# Patient Record
Sex: FEMALE | Race: BLACK OR AFRICAN AMERICAN | NOT HISPANIC OR LATINO | Employment: PART TIME | URBAN - METROPOLITAN AREA
[De-identification: names, ages, dates, MRNs, and addresses within clinical notes are randomized per-mention and may not be internally consistent; named-entity substitution may affect disease eponyms.]

---

## 2018-10-17 ENCOUNTER — HISTORICAL (OUTPATIENT)
Dept: ADMINISTRATIVE | Facility: HOSPITAL | Age: 43
End: 2018-10-17

## 2018-11-07 ENCOUNTER — HISTORICAL (OUTPATIENT)
Dept: ADMINISTRATIVE | Facility: HOSPITAL | Age: 43
End: 2018-11-07

## 2022-04-11 ENCOUNTER — HISTORICAL (OUTPATIENT)
Dept: ADMINISTRATIVE | Facility: HOSPITAL | Age: 47
End: 2022-04-11

## 2022-04-27 VITALS — WEIGHT: 183.44 LBS | HEIGHT: 66 IN | BODY MASS INDEX: 29.48 KG/M2

## 2022-05-04 NOTE — HISTORICAL [facility]
This is a historical note converted from Tre. Formatting and pictures may have been removed.  Please reference Tre for original formatting and attached multimedia. Ortho Clinic Note  ?   CC:?Right ankle fracture  ?   HPI:  42-year-old female with a right lateral malleolus fracture sustained?about 3 weeks ago.? She did it with an inversion type injury?and has been in the splint since. ?She is putting some weight on the foot?and is not having any pain.? She is eager to return to work even.? She does not smoke and has no?pertinent medical comorbidities.  ?   PE:  GEN: Well developed, well nourished  RESP: Equal chest rise bilaterally, no increased WOB  CV: Skin warm, well perfused  NEURO: AOx3, cooperative  ?  Right lower extremity:  Skin intact  No appreciable swelling  No tenderness about the medial malleolus  Minimal tenderness about the lateral malleolus  Sensation intact to light touch throughout the foot  Moves all toes and ankle without pain  DP 1+  ?  Imaging:  Right ankle films demonstrate?minimally displaced?right lateral malleolus fracture with no appreciable widening on gravity stress views  ?  A/P:  42-year-old female with right?minimally displaced lateral malleolus fracture  ?  -I discussed?surgical versus nonsurgical treatment options with her.? I explained to her that?her x-rays were borderline?for meeting?surgical?indications.? I explained the pros and cons of both operative and nonoperative management?including possible arthritis as a possible consequence of nonoperative management.? She understands these?potential concerns. ?All of her questions were answered.? She wishes to proceed with nonoperative management.  -We will place her in a cast for 3 weeks as she is already 3 weeks status post injury  ?  RTC: 3 weeks with x-rays out of cast.  ?  We will provide her with a note today saying she can return to work as long as she remains nonweightbearing to her right lower extremity   Lamarla  Gary?s?medical history, physical exam findings right ankle, diagnosis, and treatment outlined by??Hannah has been reviewed.??Treatment plan is determined to be reasonable and appropriate.?I was present during the evaluation. X-rays right ankle?reviewed.  ?

## 2022-05-04 NOTE — HISTORICAL [facility]
This is a historical note converted from Tre. Formatting and pictures may have been removed.  Please reference Tre for original formatting and attached multimedia. Ortho Clinic Note  ?   CC:?Right ankle fracture  ?   HPI:  43-year-old female with a right lateral malleolus fracture sustained 6 weeks ago.? We elected to treat this nonoperatively and she has been in a cast. ?She is tolerated this very well. ?She has no pain. ?She has done a good job staying off the fracture.  ?   PE:  GEN: Well developed, well nourished  RESP: Equal chest rise bilaterally, no increased WOB  CV: Skin warm, well perfused  NEURO: AOx3, cooperative  ?  Right lower extremity:  No swelling?about the ankle  No tenderness to palpation?about the lateral malleolus  Nearly symmetric?range of motion. ?I think she lacks 5 degrees of dorsiflexion?in both extension of the knee and flexion of the knee compared to the contralateral side  Neurovascularly intact  ?  Imaging:  X-rays today demonstrate no displacement of her lateral malleolus fracture and?there is evidence of interval healing  ?  A/P:  43-year-old?female with right lateral malleolus fracture treated nonoperatively  ?  -She is 6 weeks out and is clinically and radiographically healed.? For this reason we will start to let her weight-bear?as tolerated?on the right foot. ?We will begin in a cam boot with crutches?and wean off the crutches?first?followed by weaning off the cam boot into normal shoes.  -I am really impressed with how well she has done. ?I do not think that she will need much of anything else. ?We will see her back in 6 weeks?to reevaluate.   I was present with the resident during the history and exam.  ???  [ x] I discussed the case with the resident and agree with the findings and plan as documented in the residents note.  [ ] I discussed the case with the resident and agree with the findings and plan as documented in the residents note except:

## 2024-04-30 ENCOUNTER — OFFICE VISIT (OUTPATIENT)
Dept: INTERNAL MEDICINE | Facility: CLINIC | Age: 49
End: 2024-04-30
Payer: COMMERCIAL

## 2024-04-30 VITALS
HEIGHT: 65 IN | SYSTOLIC BLOOD PRESSURE: 126 MMHG | BODY MASS INDEX: 29.2 KG/M2 | DIASTOLIC BLOOD PRESSURE: 80 MMHG | OXYGEN SATURATION: 99 % | WEIGHT: 175.25 LBS | HEART RATE: 53 BPM | TEMPERATURE: 98 F

## 2024-04-30 DIAGNOSIS — Z00.00 ROUTINE GENERAL MEDICAL EXAMINATION AT A HEALTH CARE FACILITY: Primary | ICD-10-CM

## 2024-04-30 DIAGNOSIS — Z12.11 COLON CANCER SCREENING: ICD-10-CM

## 2024-04-30 DIAGNOSIS — I10 HYPERTENSION, UNSPECIFIED TYPE: ICD-10-CM

## 2024-04-30 PROCEDURE — 99999 PR PBB SHADOW E&M-EST. PATIENT-LVL IV: CPT | Mod: PBBFAC,,, | Performed by: INTERNAL MEDICINE

## 2024-04-30 PROCEDURE — 99386 PREV VISIT NEW AGE 40-64: CPT | Mod: S$GLB,,, | Performed by: INTERNAL MEDICINE

## 2024-04-30 RX ORDER — VALSARTAN 80 MG/1
80 TABLET ORAL DAILY
Qty: 90 TABLET | Refills: 0 | Status: SHIPPED | OUTPATIENT
Start: 2024-04-30 | End: 2025-04-30

## 2024-04-30 NOTE — PROGRESS NOTES
"Subjective:      Patient ID: Milagros Vega is a 48 y.o. female.    Chief Complaint: Establish Care    HPI      48 y.o. with  Patient Active Problem List   Diagnosis    Hypertension    Routine general medical examination at a health care facility     Past Medical History:   Diagnosis Date    Hypertension        Here today for annual prev exam.  Compliant with meds without significant side effects.  appetite are good.     Home bp 140s to 160s/ 87 to 96.     She recently resumed benazepril/amlodipine.  She feels the medication is lowering her blood pressure too fast and causing sluggishness.            Past Surgical History:   Procedure Laterality Date    TUBAL LIGATION       Social History     Socioeconomic History    Marital status: Single   Tobacco Use    Smoking status: Never    Smokeless tobacco: Never   Substance and Sexual Activity    Drug use: Never    Sexual activity: Yes     family history includes Hypertension in her mother.  Review of Systems   Constitutional:  Negative for chills, diaphoresis and fever.   HENT:  Negative for ear pain and sore throat.    Respiratory:  Negative for cough and shortness of breath.    Cardiovascular:  Negative for chest pain, palpitations and leg swelling.   Gastrointestinal:  Negative for abdominal pain and blood in stool.   Genitourinary:  Negative for dysuria and hematuria.   Skin:  Negative for rash.   Neurological:  Negative for seizures, syncope, weakness and headaches.     Objective:   /80 (BP Location: Left arm, Patient Position: Sitting, BP Method: Large (Manual))   Pulse (!) 53   Temp 97.7 °F (36.5 °C) (Tympanic)   Ht 5' 5" (1.651 m)   Wt 79.5 kg (175 lb 4.3 oz)   LMP 04/13/2024   SpO2 99%   BMI 29.17 kg/m²     Physical Exam  Constitutional:       General: She is not in acute distress.     Appearance: She is well-developed.   HENT:      Head: Normocephalic and atraumatic.   Eyes:      Extraocular Movements: Extraocular movements intact.   Neck:      " Thyroid: No thyromegaly.   Cardiovascular:      Rate and Rhythm: Normal rate and regular rhythm.   Pulmonary:      Breath sounds: Normal breath sounds. No wheezing or rales.   Abdominal:      General: Bowel sounds are normal.      Palpations: Abdomen is soft.      Tenderness: There is no abdominal tenderness.   Musculoskeletal:         General: No swelling.      Cervical back: Neck supple. No rigidity.   Lymphadenopathy:      Cervical: No cervical adenopathy.   Skin:     General: Skin is warm and dry.   Neurological:      Mental Status: She is alert and oriented to person, place, and time.   Psychiatric:         Behavior: Behavior normal.         Lab Results   Component Value Date    WBC 11.6 (H) 03/28/2020    HGB 10.9 (L) 03/28/2020    HCT 34.6 (L) 03/28/2020    MCV 89.6 03/28/2020     03/28/2020    ALT 18 03/28/2020    AST 17 03/28/2020     03/28/2020    K 3.3 (L) 03/28/2020    CALCIUM 8.7 03/28/2020    CO2 24 03/28/2020    BUN 13 03/28/2020    CREATININE 1.00 03/28/2020          The ASCVD Risk score (Temi HSU, et al., 2019) failed to calculate for the following reasons:    Cannot find a previous HDL lab    Cannot find a previous total cholesterol lab     Assessment:     1. Routine general medical examination at a health care facility    2. Hypertension, unspecified type    3. Colon cancer screening      Plan:   1. Routine general medical examination at a health care facility  Overview:  Heart healthy diet, regular exercise, and regular use of sunscreen.    reviewed.     Orders:  -     Comprehensive Metabolic Panel; Future; Expected date: 04/30/2024  -     CBC Auto Differential; Future; Expected date: 04/30/2024  -     TSH; Future; Expected date: 04/30/2024  -     Lipid Panel; Future; Expected date: 04/30/2024  -     Hepatitis C Antibody; Future; Expected date: 04/30/2024  -     HIV 1/2 Ag/Ab (4th Gen); Future; Expected date: 04/30/2024  -     Hemoglobin A1C; Future; Expected date: 04/30/2024  -      PSA, Screening; Future; Expected date: 04/30/2024    2. Hypertension, unspecified type  -     Ambulatory referral/consult to Internal Medicine    3. Colon cancer screening  -     Ambulatory referral/consult to Endo Procedure ; Future; Expected date: 05/01/2024    Other orders  -     valsartan (DIOVAN) 80 MG tablet; Take 1 tablet (80 mg total) by mouth once daily.  Dispense: 90 tablet; Refill: 0    Reports side effects with amlodipine/benazepril.  Will try valsartan.    There are no Patient Instructions on file for this visit.    Future Appointments   Date Time Provider Department Center   5/7/2024  1:00 PM MAIN ULTRASOUND, [facility] US [place] [facility]   5/7/2024  2:10 PM Estelle Holbrook MD [facility] OBG[facility]GAN [facility]   5/14/2024 11:20 AM Stephon Camara MD [facility] IM [place]   4/23/2025  1:10 PM [facility]  MAMMO1 SCREEN [facility] MAMMO [facility]   4/23/2025  2:00 PM [facility]       Lab Frequency Next Occurrence    OB/GYN Procedure ([facility]) - Extended List Once 04/29/2024       Follow up in about 2 weeks (around 5/14/2024), or if symptoms worsen or fail to improve, for fasting labs thursday.

## 2024-05-01 ENCOUNTER — HOSPITAL ENCOUNTER (OUTPATIENT)
Dept: PREADMISSION TESTING | Facility: HOSPITAL | Age: 49
Discharge: HOME | End: 2024-05-01
Attending: INTERNAL MEDICINE
Payer: COMMERCIAL

## 2024-05-01 DIAGNOSIS — Z12.11 COLON CANCER SCREENING: ICD-10-CM

## 2024-05-02 ENCOUNTER — LAB VISIT (OUTPATIENT)
Dept: LAB | Facility: HOSPITAL | Age: 49
End: 2024-05-02
Attending: INTERNAL MEDICINE
Payer: COMMERCIAL

## 2024-05-02 DIAGNOSIS — Z00.00 ROUTINE GENERAL MEDICAL EXAMINATION AT A HEALTH CARE FACILITY: ICD-10-CM

## 2024-05-02 LAB
ALBUMIN SERPL BCP-MCNC: 3.9 G/DL (ref 3.5–5.2)
ALP SERPL-CCNC: 68 U/L (ref 55–135)
ALT SERPL W/O P-5'-P-CCNC: 15 U/L (ref 10–44)
ANION GAP SERPL CALC-SCNC: 9 MMOL/L (ref 8–16)
AST SERPL-CCNC: 20 U/L (ref 10–40)
BASOPHILS # BLD AUTO: 0.03 K/UL (ref 0–0.2)
BASOPHILS NFR BLD: 0.5 % (ref 0–1.9)
BILIRUB SERPL-MCNC: 0.8 MG/DL (ref 0.1–1)
BUN SERPL-MCNC: 15 MG/DL (ref 6–20)
CALCIUM SERPL-MCNC: 9.6 MG/DL (ref 8.7–10.5)
CHLORIDE SERPL-SCNC: 108 MMOL/L (ref 95–110)
CHOLEST SERPL-MCNC: 141 MG/DL (ref 120–199)
CHOLEST/HDLC SERPL: 2.8 {RATIO} (ref 2–5)
CO2 SERPL-SCNC: 24 MMOL/L (ref 23–29)
CREAT SERPL-MCNC: 1.1 MG/DL (ref 0.5–1.4)
DIFFERENTIAL METHOD BLD: ABNORMAL
EOSINOPHIL # BLD AUTO: 0.1 K/UL (ref 0–0.5)
EOSINOPHIL NFR BLD: 1.8 % (ref 0–8)
ERYTHROCYTE [DISTWIDTH] IN BLOOD BY AUTOMATED COUNT: 13.8 % (ref 11.5–14.5)
EST. GFR  (NO RACE VARIABLE): >60 ML/MIN/1.73 M^2
ESTIMATED AVG GLUCOSE: 120 MG/DL (ref 68–131)
GLUCOSE SERPL-MCNC: 82 MG/DL (ref 70–110)
HBA1C MFR BLD: 5.8 % (ref 4–5.6)
HCT VFR BLD AUTO: 37.7 % (ref 37–48.5)
HCV AB SERPL QL IA: NORMAL
HDLC SERPL-MCNC: 51 MG/DL (ref 40–75)
HDLC SERPL: 36.2 % (ref 20–50)
HGB BLD-MCNC: 11.9 G/DL (ref 12–16)
HIV 1+2 AB+HIV1 P24 AG SERPL QL IA: NORMAL
IMM GRANULOCYTES # BLD AUTO: 0.01 K/UL (ref 0–0.04)
IMM GRANULOCYTES NFR BLD AUTO: 0.2 % (ref 0–0.5)
LDLC SERPL CALC-MCNC: 83.2 MG/DL (ref 63–159)
LYMPHOCYTES # BLD AUTO: 3.1 K/UL (ref 1–4.8)
LYMPHOCYTES NFR BLD: 54.3 % (ref 18–48)
MCH RBC QN AUTO: 29.2 PG (ref 27–31)
MCHC RBC AUTO-ENTMCNC: 31.6 G/DL (ref 32–36)
MCV RBC AUTO: 93 FL (ref 82–98)
MONOCYTES # BLD AUTO: 0.4 K/UL (ref 0.3–1)
MONOCYTES NFR BLD: 6.4 % (ref 4–15)
NEUTROPHILS # BLD AUTO: 2.1 K/UL (ref 1.8–7.7)
NEUTROPHILS NFR BLD: 36.8 % (ref 38–73)
NONHDLC SERPL-MCNC: 90 MG/DL
NRBC BLD-RTO: 0 /100 WBC
PLATELET # BLD AUTO: 241 K/UL (ref 150–450)
PMV BLD AUTO: 10.9 FL (ref 9.2–12.9)
POTASSIUM SERPL-SCNC: 3.8 MMOL/L (ref 3.5–5.1)
PROT SERPL-MCNC: 7.5 G/DL (ref 6–8.4)
RBC # BLD AUTO: 4.07 M/UL (ref 4–5.4)
SODIUM SERPL-SCNC: 141 MMOL/L (ref 136–145)
T4 FREE SERPL-MCNC: 0.78 NG/DL (ref 0.71–1.51)
TRIGL SERPL-MCNC: 34 MG/DL (ref 30–150)
TSH SERPL DL<=0.005 MIU/L-ACNC: 4.59 UIU/ML (ref 0.4–4)
WBC # BLD AUTO: 5.62 K/UL (ref 3.9–12.7)

## 2024-05-02 PROCEDURE — 85025 COMPLETE CBC W/AUTO DIFF WBC: CPT | Performed by: INTERNAL MEDICINE

## 2024-05-02 PROCEDURE — 83036 HEMOGLOBIN GLYCOSYLATED A1C: CPT | Performed by: INTERNAL MEDICINE

## 2024-05-02 PROCEDURE — 84439 ASSAY OF FREE THYROXINE: CPT | Performed by: INTERNAL MEDICINE

## 2024-05-02 PROCEDURE — 80053 COMPREHEN METABOLIC PANEL: CPT | Performed by: INTERNAL MEDICINE

## 2024-05-02 PROCEDURE — 80061 LIPID PANEL: CPT | Performed by: INTERNAL MEDICINE

## 2024-05-02 PROCEDURE — 86803 HEPATITIS C AB TEST: CPT | Performed by: INTERNAL MEDICINE

## 2024-05-02 PROCEDURE — 87389 HIV-1 AG W/HIV-1&-2 AB AG IA: CPT | Performed by: INTERNAL MEDICINE

## 2024-05-02 PROCEDURE — 84443 ASSAY THYROID STIM HORMONE: CPT | Performed by: INTERNAL MEDICINE

## 2024-05-02 PROCEDURE — 36415 COLL VENOUS BLD VENIPUNCTURE: CPT | Performed by: INTERNAL MEDICINE

## 2024-05-08 ENCOUNTER — PATIENT MESSAGE (OUTPATIENT)
Dept: RESEARCH | Facility: HOSPITAL | Age: 49
End: 2024-05-08
Payer: COMMERCIAL

## 2024-05-14 ENCOUNTER — OFFICE VISIT (OUTPATIENT)
Dept: INTERNAL MEDICINE | Facility: CLINIC | Age: 49
End: 2024-05-14
Payer: COMMERCIAL

## 2024-05-14 VITALS
DIASTOLIC BLOOD PRESSURE: 82 MMHG | BODY MASS INDEX: 29.94 KG/M2 | SYSTOLIC BLOOD PRESSURE: 136 MMHG | WEIGHT: 179.69 LBS | TEMPERATURE: 97 F | HEIGHT: 65 IN | OXYGEN SATURATION: 99 % | HEART RATE: 57 BPM

## 2024-05-14 DIAGNOSIS — I10 HYPERTENSION, UNSPECIFIED TYPE: Primary | ICD-10-CM

## 2024-05-14 DIAGNOSIS — R79.89 ELEVATED TSH: ICD-10-CM

## 2024-05-14 PROCEDURE — 99213 OFFICE O/P EST LOW 20 MIN: CPT | Mod: S$GLB,,, | Performed by: INTERNAL MEDICINE

## 2024-05-14 PROCEDURE — 99999 PR PBB SHADOW E&M-EST. PATIENT-LVL IV: CPT | Mod: PBBFAC,,, | Performed by: INTERNAL MEDICINE

## 2024-05-14 NOTE — PROGRESS NOTES
"Subjective:      Patient ID: Milagros Vega is a 48 y.o. female.    Chief Complaint: Follow-up    HPI    47 yo with   Patient Active Problem List   Diagnosis    Hypertension    Routine general medical examination at a health care facility     Past Medical History:   Diagnosis Date    Hypertension      Here today for f/u on htn.    Home bp 128 to 130/ 84 to 86.       Energy Has improved off of amlodipine.        Review of Systems   Constitutional:  Negative for chills and fever.   Respiratory:  Negative for cough.    Cardiovascular:  Negative for chest pain.   Gastrointestinal:  Negative for abdominal pain.     Objective:   /82 (BP Location: Left arm, Patient Position: Sitting, BP Method: Large (Manual))   Pulse (!) 57   Temp 96.5 °F (35.8 °C) (Tympanic)   Ht 5' 5" (1.651 m)   Wt 81.5 kg (179 lb 10.8 oz)   LMP 04/13/2024   SpO2 99%   BMI 29.90 kg/m²     Physical Exam  Constitutional:       General: She is awake.      Appearance: Normal appearance.   HENT:      Head: Normocephalic and atraumatic.   Eyes:      Conjunctiva/sclera: Conjunctivae normal.   Pulmonary:      Effort: Pulmonary effort is normal.   Musculoskeletal:      Cervical back: Normal range of motion.   Neurological:      Mental Status: She is alert. Mental status is at baseline.   Psychiatric:         Mood and Affect: Mood normal.         Behavior: Behavior normal. Behavior is cooperative.         Thought Content: Thought content normal.         Judgment: Judgment normal.         Lab Results   Component Value Date    WBC 5.62 05/02/2024    HGB 11.9 (L) 05/02/2024    HGB 10.9 (L) 03/28/2020    HCT 37.7 05/02/2024    MCV 93 05/02/2024    MCV 89.6 03/28/2020     05/02/2024    CHOL 141 05/02/2024    TRIG 34 05/02/2024    HDL 51 05/02/2024    LDLCALC 83.2 05/02/2024    ALT 15 05/02/2024    AST 20 05/02/2024     05/02/2024    K 3.8 05/02/2024    CALCIUM 9.6 05/02/2024     05/02/2024    CO2 24 05/02/2024    BUN 15 05/02/2024    " CREATININE 1.1 05/02/2024    CREATININE 1.00 03/28/2020    EGFRNORACEVR >60.0 05/02/2024    TSH 4.594 (H) 05/02/2024    GLU 82 05/02/2024    HGBA1C 5.8 (H) 05/02/2024          The 10-year ASCVD risk score (Temi HSU, et al., 2019) is: 3.1%    Values used to calculate the score:      Age: 48 years      Sex: Female      Is Non- : Yes      Diabetic: No      Tobacco smoker: No      Systolic Blood Pressure: 136 mmHg      Is BP treated: Yes      HDL Cholesterol: 51 mg/dL      Total Cholesterol: 141 mg/dL     Assessment:     1. Hypertension, unspecified type    2. Elevated TSH      Plan:   1. Hypertension, unspecified type  Overview:  Controlled.  Continue current medications.        2. Elevated TSH  -     TSH; Future; Expected date: 11/10/2024  -     T4, FREE; Future; Expected date: 11/14/2024        There are no Patient Instructions on file for this visit.    Future Appointments   Date Time Provider Department Center   5/16/2024  3:30 PM Estelle Holbrook MD [facility]   11/8/2024  9:50 AM LABORATORY, [facility]   11/15/2024  1:40 PM Stephon Camara MD [facility] IM [facility]   4/23/2025  1:10 PM [facility]  MAMMO1 SCREEN [facility] MAMMO [facility]   4/23/2025  2:00 PM Estelle Holbrook MD [facility] O[facility]       Lab Frequency Next Occurrence   PSA, Screening Once 04/30/2024       Follow up in about 6 months (around 11/14/2024), or if symptoms worsen or fail to improve.

## 2024-08-05 PROBLEM — Z00.00 ROUTINE GENERAL MEDICAL EXAMINATION AT A HEALTH CARE FACILITY: Status: RESOLVED | Noted: 2024-04-30 | Resolved: 2024-08-05

## 2024-11-08 ENCOUNTER — LAB VISIT (OUTPATIENT)
Dept: LAB | Facility: HOSPITAL | Age: 49
End: 2024-11-08
Attending: INTERNAL MEDICINE
Payer: COMMERCIAL

## 2024-11-08 DIAGNOSIS — R79.89 ELEVATED TSH: ICD-10-CM

## 2024-11-08 LAB
T4 FREE SERPL-MCNC: 0.76 NG/DL (ref 0.71–1.51)
TSH SERPL DL<=0.005 MIU/L-ACNC: 4.33 UIU/ML (ref 0.4–4)

## 2024-11-08 PROCEDURE — 84443 ASSAY THYROID STIM HORMONE: CPT | Performed by: INTERNAL MEDICINE

## 2024-11-08 PROCEDURE — 36415 COLL VENOUS BLD VENIPUNCTURE: CPT | Performed by: INTERNAL MEDICINE

## 2024-11-08 PROCEDURE — 84439 ASSAY OF FREE THYROXINE: CPT | Performed by: INTERNAL MEDICINE

## 2024-11-15 ENCOUNTER — OFFICE VISIT (OUTPATIENT)
Dept: INTERNAL MEDICINE | Facility: CLINIC | Age: 49
End: 2024-11-15
Payer: COMMERCIAL

## 2024-11-15 VITALS
BODY MASS INDEX: 30.85 KG/M2 | TEMPERATURE: 96 F | WEIGHT: 185.19 LBS | DIASTOLIC BLOOD PRESSURE: 92 MMHG | SYSTOLIC BLOOD PRESSURE: 150 MMHG | HEIGHT: 65 IN | HEART RATE: 56 BPM | OXYGEN SATURATION: 99 %

## 2024-11-15 DIAGNOSIS — Z12.11 COLON CANCER SCREENING: ICD-10-CM

## 2024-11-15 DIAGNOSIS — I10 HYPERTENSION, UNSPECIFIED TYPE: Primary | ICD-10-CM

## 2024-11-15 PROCEDURE — 1159F MED LIST DOCD IN RCRD: CPT | Mod: CPTII,S$GLB,, | Performed by: INTERNAL MEDICINE

## 2024-11-15 PROCEDURE — 99999 PR PBB SHADOW E&M-EST. PATIENT-LVL IV: CPT | Mod: PBBFAC,,, | Performed by: INTERNAL MEDICINE

## 2024-11-15 PROCEDURE — 3080F DIAST BP >= 90 MM HG: CPT | Mod: CPTII,S$GLB,, | Performed by: INTERNAL MEDICINE

## 2024-11-15 PROCEDURE — 3044F HG A1C LEVEL LT 7.0%: CPT | Mod: CPTII,S$GLB,, | Performed by: INTERNAL MEDICINE

## 2024-11-15 PROCEDURE — 99213 OFFICE O/P EST LOW 20 MIN: CPT | Mod: S$GLB,,, | Performed by: INTERNAL MEDICINE

## 2024-11-15 PROCEDURE — 3077F SYST BP >= 140 MM HG: CPT | Mod: CPTII,S$GLB,, | Performed by: INTERNAL MEDICINE

## 2024-11-15 PROCEDURE — 4010F ACE/ARB THERAPY RXD/TAKEN: CPT | Mod: CPTII,S$GLB,, | Performed by: INTERNAL MEDICINE

## 2024-11-15 PROCEDURE — 3008F BODY MASS INDEX DOCD: CPT | Mod: CPTII,S$GLB,, | Performed by: INTERNAL MEDICINE

## 2024-11-15 RX ORDER — VALSARTAN 80 MG/1
80 TABLET ORAL DAILY
Qty: 90 TABLET | Refills: 1 | Status: SHIPPED | OUTPATIENT
Start: 2024-11-15 | End: 2025-11-15

## 2024-11-15 NOTE — PROGRESS NOTES
"Subjective:      Patient ID: Milagros Vega is a 49 y.o. female.    Chief Complaint: Follow-up    HPI    48 yo with   Patient Active Problem List   Diagnosis    Hypertension     Past Medical History:   Diagnosis Date    Hypertension      Here today for f/u on htn.     Home blood pressure 160/90.    She takes her medicine 4-5 days per week    Review of Systems   Constitutional:  Negative for chills and fever.   HENT:  Negative for ear pain and sore throat.    Respiratory:  Negative for cough.    Cardiovascular:  Negative for chest pain.   Gastrointestinal:  Negative for abdominal pain and blood in stool.   Genitourinary:  Negative for dysuria and hematuria.   Neurological:  Negative for seizures and syncope.     Objective:   BP (!) 150/92   Pulse (!) 56   Temp 96.3 °F (35.7 °C) (Tympanic)   Ht 5' 5" (1.651 m)   Wt 84 kg (185 lb 3 oz)   SpO2 99%   BMI 30.82 kg/m²     Physical Exam  Constitutional:       General: She is awake. She is not in acute distress.     Appearance: Normal appearance. She is well-developed. She is not ill-appearing.   HENT:      Head: Normocephalic and atraumatic.   Eyes:      Conjunctiva/sclera: Conjunctivae normal.   Cardiovascular:      Rate and Rhythm: Normal rate.   Pulmonary:      Effort: Pulmonary effort is normal.   Musculoskeletal:      Cervical back: Normal range of motion.   Skin:     General: Skin is warm and dry.   Neurological:      Mental Status: She is alert. Mental status is at baseline.   Psychiatric:         Mood and Affect: Mood normal.         Behavior: Behavior normal. Behavior is cooperative.         Thought Content: Thought content normal.         Judgment: Judgment normal.         Lab Results   Component Value Date    WBC 5.62 05/02/2024    HGB 11.9 (L) 05/02/2024    HGB 10.9 (L) 03/28/2020    HCT 37.7 05/02/2024    MCV 93 05/02/2024    MCV 89.6 03/28/2020     05/02/2024    CHOL 141 05/02/2024    TRIG 34 05/02/2024    HDL 51 05/02/2024    LDLCALC 83.2 " 05/02/2024    ALT 15 05/02/2024    AST 20 05/02/2024     05/02/2024    K 3.8 05/02/2024    CALCIUM 9.6 05/02/2024     05/02/2024    CO2 24 05/02/2024    BUN 15 05/02/2024    CREATININE 1.1 05/02/2024    CREATININE 1.00 03/28/2020    EGFRNORACEVR >60.0 05/02/2024    TSH 4.332 (H) 11/08/2024    TSH 4.594 (H) 05/02/2024    GLU 82 05/02/2024    HGBA1C 5.8 (H) 05/02/2024          The 10-year ASCVD risk score (Temi HSU, et al., 2019) is: 5%    Values used to calculate the score:      Age: 49 years      Sex: Female      Is Non- : Yes      Diabetic: No      Tobacco smoker: No      Systolic Blood Pressure: 150 mmHg      Is BP treated: Yes      HDL Cholesterol: 51 mg/dL      Total Cholesterol: 141 mg/dL     Assessment:     1. Hypertension, unspecified type    2. Colon cancer screening      Plan:   1. Hypertension, unspecified type  Overview:  Not at goal. Stressed compliance with medication. Continue current medications.        2. Colon cancer screening  -     Cologuard Screening (Multitarget Stool DNA); Future; Expected date: 11/15/2024    Other orders  -     valsartan (DIOVAN) 80 MG tablet; Take 1 tablet (80 mg total) by mouth once daily.  Dispense: 90 tablet; Refill: 1        There are no Patient Instructions on file for this visit.    Future Appointments   Date Time Provider Department Center   12/13/2024 10:40 AM Stephon Camara MD [facility] IM [place]   4/23/2025  1:10 PM [facility]  MAMMO1 SCREEN [facility] MAMMO [place] [facility]   4/23/2025  2:00 PM Estelle Holbrook MD [facility]BGYN [place] [facility]           Follow up in about 4 weeks (around 12/13/2024), or if symptoms worsen or fail to improve.

## 2024-12-13 ENCOUNTER — OFFICE VISIT (OUTPATIENT)
Dept: INTERNAL MEDICINE | Facility: CLINIC | Age: 49
End: 2024-12-13
Payer: COMMERCIAL

## 2024-12-13 VITALS
SYSTOLIC BLOOD PRESSURE: 142 MMHG | BODY MASS INDEX: 30.64 KG/M2 | WEIGHT: 183.88 LBS | HEART RATE: 60 BPM | HEIGHT: 65 IN | DIASTOLIC BLOOD PRESSURE: 84 MMHG | OXYGEN SATURATION: 100 % | TEMPERATURE: 96 F

## 2024-12-13 DIAGNOSIS — I10 HYPERTENSION, UNSPECIFIED TYPE: Primary | ICD-10-CM

## 2024-12-13 PROCEDURE — 99999 PR PBB SHADOW E&M-EST. PATIENT-LVL III: CPT | Mod: PBBFAC,,, | Performed by: INTERNAL MEDICINE

## 2024-12-13 RX ORDER — HYDROCHLOROTHIAZIDE 12.5 MG/1
12.5 CAPSULE ORAL DAILY
Qty: 90 CAPSULE | Refills: 0 | Status: SHIPPED | OUTPATIENT
Start: 2024-12-13 | End: 2025-03-13

## 2024-12-13 NOTE — PROGRESS NOTES
"Subjective:      Patient ID: Milagros Vega is a 49 y.o. female.    Chief Complaint: Follow-up    Follow-up  Pertinent negatives include no abdominal pain, chest pain, chills, coughing or fever.       50 yo with   Patient Active Problem List   Diagnosis    Hypertension     Past Medical History:   Diagnosis Date    Hypertension      Here today for f/u on htn.    Compliant with meds without significant side effects.     Home bp 160s/91.    Review of Systems   Constitutional:  Negative for chills and fever.   Respiratory:  Negative for cough.    Cardiovascular:  Negative for chest pain.   Gastrointestinal:  Negative for abdominal pain.     Objective:   BP (!) 142/84 (BP Location: Left arm, Patient Position: Sitting)   Pulse 60   Temp 96.3 °F (35.7 °C)   Ht 5' 5" (1.651 m)   Wt 83.4 kg (183 lb 13.8 oz)   SpO2 100%   BMI 30.60 kg/m²     Physical Exam  Constitutional:       General: She is awake.      Appearance: Normal appearance.   HENT:      Head: Normocephalic and atraumatic.   Eyes:      Conjunctiva/sclera: Conjunctivae normal.   Pulmonary:      Effort: Pulmonary effort is normal.   Musculoskeletal:      Cervical back: Normal range of motion.   Neurological:      Mental Status: She is alert. Mental status is at baseline.   Psychiatric:         Mood and Affect: Mood normal.         Behavior: Behavior normal. Behavior is cooperative.         Thought Content: Thought content normal.         Judgment: Judgment normal.         Lab Results   Component Value Date    WBC 5.62 05/02/2024    HGB 11.9 (L) 05/02/2024    HGB 10.9 (L) 03/28/2020    HCT 37.7 05/02/2024    MCV 93 05/02/2024    MCV 89.6 03/28/2020     05/02/2024    CHOL 141 05/02/2024    TRIG 34 05/02/2024    HDL 51 05/02/2024    LDLCALC 83.2 05/02/2024    ALT 15 05/02/2024    AST 20 05/02/2024     05/02/2024    K 3.8 05/02/2024    CALCIUM 9.6 05/02/2024     05/02/2024    CO2 24 05/02/2024    BUN 15 05/02/2024    CREATININE 1.1 05/02/2024    " CREATININE 1.00 03/28/2020    EGFRNORACEVR >60.0 05/02/2024    TSH 4.332 (H) 11/08/2024    TSH 4.594 (H) 05/02/2024    GLU 82 05/02/2024    HGBA1C 5.8 (H) 05/02/2024          The 10-year ASCVD risk score (Temi HSU, et al., 2019) is: 4%    Values used to calculate the score:      Age: 49 years      Sex: Female      Is Non- : Yes      Diabetic: No      Tobacco smoker: No      Systolic Blood Pressure: 142 mmHg      Is BP treated: Yes      HDL Cholesterol: 51 mg/dL      Total Cholesterol: 141 mg/dL     Assessment:     1. Hypertension, unspecified type      Plan:   1. Hypertension, unspecified type  Overview:  Controlled.  Continue current medications.      Orders:  -     hydroCHLOROthiazide (MICROZIDE) 12.5 mg capsule; Take 1 capsule (12.5 mg total) by mouth once daily.  Dispense: 90 capsule; Refill: 0  -     Basic Metabolic Panel; Future; Expected date: 12/27/2024    Bp not at goal. Add hctz.     There are no Patient Instructions on file for this visit.    Future Appointments   Date Time Provider Department Center   1/24/2025  1:00 PM Stephon Camara MD [facility]   4/23/2025  1:10 PM [facility]  MAMMO1 SCREEN [facility]MO [place] [facility]   4/23/2025  2:00 PM Estelle Holbrook MD [facility]YN [place] [facility]           Follow up in about 6 weeks (around 1/24/2025), or if symptoms worsen or fail to improve.

## 2024-12-27 ENCOUNTER — LAB VISIT (OUTPATIENT)
Dept: LAB | Facility: HOSPITAL | Age: 49
End: 2024-12-27
Attending: INTERNAL MEDICINE
Payer: COMMERCIAL

## 2024-12-27 DIAGNOSIS — I10 HYPERTENSION, UNSPECIFIED TYPE: ICD-10-CM

## 2024-12-27 LAB
ANION GAP SERPL CALC-SCNC: 9 MMOL/L (ref 8–16)
BUN SERPL-MCNC: 11 MG/DL (ref 6–20)
CALCIUM SERPL-MCNC: 9.2 MG/DL (ref 8.7–10.5)
CHLORIDE SERPL-SCNC: 105 MMOL/L (ref 95–110)
CO2 SERPL-SCNC: 25 MMOL/L (ref 23–29)
CREAT SERPL-MCNC: 0.9 MG/DL (ref 0.5–1.4)
EST. GFR  (NO RACE VARIABLE): >60 ML/MIN/1.73 M^2
GLUCOSE SERPL-MCNC: 92 MG/DL (ref 70–110)
POTASSIUM SERPL-SCNC: 4 MMOL/L (ref 3.5–5.1)
SODIUM SERPL-SCNC: 139 MMOL/L (ref 136–145)

## 2024-12-27 PROCEDURE — 80048 BASIC METABOLIC PNL TOTAL CA: CPT | Performed by: INTERNAL MEDICINE

## 2024-12-27 PROCEDURE — 36415 COLL VENOUS BLD VENIPUNCTURE: CPT | Performed by: INTERNAL MEDICINE

## 2025-01-24 ENCOUNTER — OFFICE VISIT (OUTPATIENT)
Dept: INTERNAL MEDICINE | Facility: CLINIC | Age: 50
End: 2025-01-24
Payer: COMMERCIAL

## 2025-01-24 VITALS
WEIGHT: 186.5 LBS | SYSTOLIC BLOOD PRESSURE: 136 MMHG | DIASTOLIC BLOOD PRESSURE: 78 MMHG | HEIGHT: 65 IN | TEMPERATURE: 98 F | BODY MASS INDEX: 31.07 KG/M2 | HEART RATE: 67 BPM | OXYGEN SATURATION: 99 %

## 2025-01-24 DIAGNOSIS — Z00.00 PREVENTATIVE HEALTH CARE: ICD-10-CM

## 2025-01-24 DIAGNOSIS — I10 HYPERTENSION, UNSPECIFIED TYPE: ICD-10-CM

## 2025-01-24 PROCEDURE — 3075F SYST BP GE 130 - 139MM HG: CPT | Mod: CPTII,S$GLB,, | Performed by: INTERNAL MEDICINE

## 2025-01-24 PROCEDURE — 1159F MED LIST DOCD IN RCRD: CPT | Mod: CPTII,S$GLB,, | Performed by: INTERNAL MEDICINE

## 2025-01-24 PROCEDURE — 3078F DIAST BP <80 MM HG: CPT | Mod: CPTII,S$GLB,, | Performed by: INTERNAL MEDICINE

## 2025-01-24 PROCEDURE — 99999 PR PBB SHADOW E&M-EST. PATIENT-LVL IV: CPT | Mod: PBBFAC,,, | Performed by: INTERNAL MEDICINE

## 2025-01-24 PROCEDURE — 99213 OFFICE O/P EST LOW 20 MIN: CPT | Mod: S$GLB,,, | Performed by: INTERNAL MEDICINE

## 2025-01-24 PROCEDURE — 3008F BODY MASS INDEX DOCD: CPT | Mod: CPTII,S$GLB,, | Performed by: INTERNAL MEDICINE

## 2025-01-24 RX ORDER — VALSARTAN 80 MG/1
80 TABLET ORAL DAILY
Qty: 90 TABLET | Refills: 0 | Status: SHIPPED | OUTPATIENT
Start: 2025-01-24 | End: 2026-01-24

## 2025-01-24 RX ORDER — HYDROCHLOROTHIAZIDE 12.5 MG/1
12.5 CAPSULE ORAL DAILY
Qty: 90 CAPSULE | Refills: 0 | Status: SHIPPED | OUTPATIENT
Start: 2025-01-24 | End: 2025-04-24

## 2025-01-24 NOTE — PROGRESS NOTES
"Subjective:      Patient ID: Milagros Vega is a 49 y.o. female.    Chief Complaint: Follow-up    Follow-up  Pertinent negatives include no abdominal pain, chest pain, chills, coughing or fever.       50 yo with   Patient Active Problem List   Diagnosis    Hypertension     Past Medical History:   Diagnosis Date    Hypertension      Here today for f/u on htn.  Compliant with meds without significant side effects. p    Home bp 1117 - 134/ 64-  79      Review of Systems   Constitutional:  Negative for chills and fever.   Respiratory:  Negative for cough.    Cardiovascular:  Negative for chest pain.   Gastrointestinal:  Negative for abdominal pain.     Objective:   /78 (BP Location: Right arm, Patient Position: Sitting)   Pulse 67   Temp 98.1 °F (36.7 °C) (Tympanic)   Ht 5' 5" (1.651 m)   Wt 84.6 kg (186 lb 8.2 oz)   SpO2 99%   BMI 31.04 kg/m²     Physical Exam  Constitutional:       General: She is awake.      Appearance: Normal appearance.   HENT:      Head: Normocephalic and atraumatic.   Eyes:      Conjunctiva/sclera: Conjunctivae normal.   Pulmonary:      Effort: Pulmonary effort is normal.   Musculoskeletal:      Cervical back: Normal range of motion.   Neurological:      Mental Status: She is alert. Mental status is at baseline.   Psychiatric:         Mood and Affect: Mood normal.         Behavior: Behavior normal. Behavior is cooperative.         Thought Content: Thought content normal.         Judgment: Judgment normal.         Lab Results   Component Value Date    WBC 5.62 05/02/2024    HGB 11.9 (L) 05/02/2024    HGB 10.9 (L) 03/28/2020    HCT 37.7 05/02/2024    MCV 93 05/02/2024    MCV 89.6 03/28/2020     05/02/2024    CHOL 141 05/02/2024    TRIG 34 05/02/2024    HDL 51 05/02/2024    LDLCALC 83.2 05/02/2024    ALT 15 05/02/2024    AST 20 05/02/2024     12/27/2024    K 4.0 12/27/2024    CALCIUM 9.2 12/27/2024     12/27/2024    CO2 25 12/27/2024    BUN 11 12/27/2024    CREATININE " 0.9 12/27/2024    CREATININE 1.1 05/02/2024    CREATININE 1.00 03/28/2020    EGFRNORACEVR >60.0 12/27/2024    EGFRNORACEVR >60.0 05/02/2024    TSH 4.332 (H) 11/08/2024    TSH 4.594 (H) 05/02/2024    GLU 92 12/27/2024    HGBA1C 5.8 (H) 05/02/2024          The 10-year ASCVD risk score (Temi HSU, et al., 2019) is: 3.3%    Values used to calculate the score:      Age: 49 years      Sex: Female      Is Non- : Yes      Diabetic: No      Tobacco smoker: No      Systolic Blood Pressure: 136 mmHg      Is BP treated: Yes      HDL Cholesterol: 51 mg/dL      Total Cholesterol: 141 mg/dL     Assessment:     1. Hypertension, unspecified type    2. Preventative health care      Plan:   1. Hypertension, unspecified type  Overview:  Controlled.  Continue current medications.      Orders:  -     hydroCHLOROthiazide (MICROZIDE) 12.5 mg capsule; Take 1 capsule (12.5 mg total) by mouth once daily.  Dispense: 90 capsule; Refill: 0  -     valsartan (DIOVAN) 80 MG tablet; Take 1 tablet (80 mg total) by mouth once daily.  Dispense: 90 tablet; Refill: 0    2. Preventative health care  -     CBC Auto Differential; Future; Expected date: 04/24/2025  -     Comprehensive Metabolic Panel; Future; Expected date: 04/24/2025  -     TSH; Future; Expected date: 04/24/2025  -     Lipid Panel; Future; Expected date: 04/24/2025        There are no Patient Instructions on file for this visit.    Future Appointments   Date Time Provider Department Center   4/17/2025  7:50 AM LABORATORY, [facility] [place]   4/23/2025  1:10 PM [facility]  [id number] SCREEN [facility] MAMMO [place] [facility]   4/23/2025  2:00 PM Estelle Holbrook MD [facility] OBGYN [place] [facility]   4/24/2025  1:00 PM Stephon Camara MD [facility] [place]       Lab Frequency Next Occurrence   CBC Auto Differential Once 04/24/2025   Comprehensive Metabolic Panel Once 04/24/2025   TSH Once 04/24/2025   Lipid Panel Once 04/24/2025       Follow up in about 3 months (around 4/24/2025), or if symptoms  worsen or fail to improve.

## 2025-04-17 ENCOUNTER — LAB VISIT (OUTPATIENT)
Dept: LAB | Facility: HOSPITAL | Age: 50
End: 2025-04-17
Attending: INTERNAL MEDICINE
Payer: COMMERCIAL

## 2025-04-17 DIAGNOSIS — Z00.00 PREVENTATIVE HEALTH CARE: ICD-10-CM

## 2025-04-17 LAB
ABSOLUTE EOSINOPHIL (OHS): 0.16 K/UL
ABSOLUTE MONOCYTE (OHS): 0.48 K/UL (ref 0.3–1)
ABSOLUTE NEUTROPHIL COUNT (OHS): 2.29 K/UL (ref 1.8–7.7)
ALBUMIN SERPL BCP-MCNC: 3.5 G/DL (ref 3.5–5.2)
ALP SERPL-CCNC: 70 UNIT/L (ref 40–150)
ALT SERPL W/O P-5'-P-CCNC: 20 UNIT/L (ref 10–44)
ANION GAP (OHS): 7 MMOL/L (ref 8–16)
AST SERPL-CCNC: 23 UNIT/L (ref 11–45)
BASOPHILS # BLD AUTO: 0.07 K/UL
BASOPHILS NFR BLD AUTO: 1 %
BILIRUB SERPL-MCNC: 0.8 MG/DL (ref 0.1–1)
BUN SERPL-MCNC: 13 MG/DL (ref 6–20)
CALCIUM SERPL-MCNC: 8.8 MG/DL (ref 8.7–10.5)
CHLORIDE SERPL-SCNC: 108 MMOL/L (ref 95–110)
CHOLEST SERPL-MCNC: 157 MG/DL (ref 120–199)
CHOLEST/HDLC SERPL: 2.9 {RATIO} (ref 2–5)
CO2 SERPL-SCNC: 27 MMOL/L (ref 23–29)
CREAT SERPL-MCNC: 1.1 MG/DL (ref 0.5–1.4)
ERYTHROCYTE [DISTWIDTH] IN BLOOD BY AUTOMATED COUNT: 14.1 % (ref 11.5–14.5)
GFR SERPLBLD CREATININE-BSD FMLA CKD-EPI: >60 ML/MIN/1.73/M2
GLUCOSE SERPL-MCNC: 97 MG/DL (ref 70–110)
HCT VFR BLD AUTO: 38.2 % (ref 37–48.5)
HDLC SERPL-MCNC: 55 MG/DL (ref 40–75)
HDLC SERPL: 35 % (ref 20–50)
HGB BLD-MCNC: 11.9 GM/DL (ref 12–16)
IMM GRANULOCYTES # BLD AUTO: 0.01 K/UL (ref 0–0.04)
IMM GRANULOCYTES NFR BLD AUTO: 0.1 % (ref 0–0.5)
LDLC SERPL CALC-MCNC: 95 MG/DL (ref 63–159)
LYMPHOCYTES # BLD AUTO: 3.67 K/UL (ref 1–4.8)
MCH RBC QN AUTO: 28.6 PG (ref 27–31)
MCHC RBC AUTO-ENTMCNC: 31.2 G/DL (ref 32–36)
MCV RBC AUTO: 92 FL (ref 82–98)
NONHDLC SERPL-MCNC: 102 MG/DL
NUCLEATED RBC (/100WBC) (OHS): 0 /100 WBC
PLATELET # BLD AUTO: 306 K/UL (ref 150–450)
PMV BLD AUTO: 10.8 FL (ref 9.2–12.9)
POTASSIUM SERPL-SCNC: 3.9 MMOL/L (ref 3.5–5.1)
PROT SERPL-MCNC: 7.3 GM/DL (ref 6–8.4)
RBC # BLD AUTO: 4.16 M/UL (ref 4–5.4)
RELATIVE EOSINOPHIL (OHS): 2.4 %
RELATIVE LYMPHOCYTE (OHS): 54.9 % (ref 18–48)
RELATIVE MONOCYTE (OHS): 7.2 % (ref 4–15)
RELATIVE NEUTROPHIL (OHS): 34.4 % (ref 38–73)
SODIUM SERPL-SCNC: 142 MMOL/L (ref 136–145)
T4 FREE SERPL-MCNC: 0.84 NG/DL (ref 0.71–1.51)
TRIGL SERPL-MCNC: 35 MG/DL (ref 30–150)
TSH SERPL-ACNC: 4.29 UIU/ML (ref 0.4–4)
WBC # BLD AUTO: 6.68 K/UL (ref 3.9–12.7)

## 2025-04-17 PROCEDURE — 80053 COMPREHEN METABOLIC PANEL: CPT

## 2025-04-17 PROCEDURE — 85025 COMPLETE CBC W/AUTO DIFF WBC: CPT

## 2025-04-17 PROCEDURE — 82465 ASSAY BLD/SERUM CHOLESTEROL: CPT

## 2025-04-17 PROCEDURE — 84439 ASSAY OF FREE THYROXINE: CPT

## 2025-04-17 PROCEDURE — 84443 ASSAY THYROID STIM HORMONE: CPT

## 2025-04-17 PROCEDURE — 36415 COLL VENOUS BLD VENIPUNCTURE: CPT

## 2025-04-24 ENCOUNTER — OFFICE VISIT (OUTPATIENT)
Dept: INTERNAL MEDICINE | Facility: CLINIC | Age: 50
End: 2025-04-24
Payer: COMMERCIAL

## 2025-04-24 VITALS
DIASTOLIC BLOOD PRESSURE: 92 MMHG | OXYGEN SATURATION: 99 % | BODY MASS INDEX: 29.97 KG/M2 | WEIGHT: 179.88 LBS | TEMPERATURE: 97 F | SYSTOLIC BLOOD PRESSURE: 140 MMHG | HEART RATE: 60 BPM | HEIGHT: 65 IN

## 2025-04-24 DIAGNOSIS — I10 HYPERTENSION, UNSPECIFIED TYPE: ICD-10-CM

## 2025-04-24 DIAGNOSIS — Z00.00 ROUTINE GENERAL MEDICAL EXAMINATION AT A HEALTH CARE FACILITY: Primary | ICD-10-CM

## 2025-04-24 PROCEDURE — 99999 PR PBB SHADOW E&M-EST. PATIENT-LVL IV: CPT | Mod: PBBFAC,,, | Performed by: INTERNAL MEDICINE

## 2025-04-24 PROCEDURE — 99396 PREV VISIT EST AGE 40-64: CPT | Mod: S$GLB,,, | Performed by: INTERNAL MEDICINE

## 2025-04-24 PROCEDURE — 3008F BODY MASS INDEX DOCD: CPT | Mod: CPTII,S$GLB,, | Performed by: INTERNAL MEDICINE

## 2025-04-24 PROCEDURE — 4010F ACE/ARB THERAPY RXD/TAKEN: CPT | Mod: CPTII,S$GLB,, | Performed by: INTERNAL MEDICINE

## 2025-04-24 PROCEDURE — 3075F SYST BP GE 130 - 139MM HG: CPT | Mod: CPTII,S$GLB,, | Performed by: INTERNAL MEDICINE

## 2025-04-24 PROCEDURE — 3080F DIAST BP >= 90 MM HG: CPT | Mod: CPTII,S$GLB,, | Performed by: INTERNAL MEDICINE

## 2025-04-24 PROCEDURE — 1159F MED LIST DOCD IN RCRD: CPT | Mod: CPTII,S$GLB,, | Performed by: INTERNAL MEDICINE

## 2025-04-24 RX ORDER — HYDROCHLOROTHIAZIDE 12.5 MG/1
12.5 CAPSULE ORAL DAILY
Qty: 90 CAPSULE | Refills: 3 | Status: SHIPPED | OUTPATIENT
Start: 2025-04-24 | End: 2026-04-24

## 2025-04-24 RX ORDER — VALSARTAN 80 MG/1
80 TABLET ORAL DAILY
Qty: 90 TABLET | Refills: 3 | Status: SHIPPED | OUTPATIENT
Start: 2025-04-24 | End: 2026-04-24

## 2025-04-24 NOTE — PROGRESS NOTES
"Subjective:      Patient ID: Milagros Vega is a 49 y.o. female.    Chief Complaint: Follow-up    Follow-up  Pertinent negatives include no abdominal pain, chest pain, chills, coughing, fever or sore throat.     History of Present Illness                 49 y.o. with  Problem List[1]  Past Medical History:   Diagnosis Date    Hypertension        Here today for annual prev exam.  Compliant with meds without significant side effects. Energy and appetite are good.     Home bp 130s/ 80s. Declined med adjustment.     Past Surgical History:   Procedure Laterality Date    TUBAL LIGATION       Social History[2]  family history includes Hypertension in her mother.  Review of Systems   Constitutional:  Negative for chills and fever.   HENT:  Negative for ear pain and sore throat.    Respiratory:  Negative for cough.    Cardiovascular:  Negative for chest pain.   Gastrointestinal:  Negative for abdominal pain and blood in stool.   Genitourinary:  Negative for dysuria and hematuria.   Neurological:  Negative for seizures and syncope.     Objective:   BP (!) 140/92   Pulse 60   Temp 97.4 °F (36.3 °C)   Ht 5' 5" (1.651 m)   Wt 81.6 kg (179 lb 14.3 oz)   SpO2 99%   BMI 29.94 kg/m²     Physical Exam  Constitutional:       General: She is not in acute distress.     Appearance: She is well-developed.   HENT:      Head: Normocephalic and atraumatic.   Eyes:      Extraocular Movements: Extraocular movements intact.   Neck:      Thyroid: No thyromegaly.   Cardiovascular:      Rate and Rhythm: Normal rate and regular rhythm.   Pulmonary:      Breath sounds: Normal breath sounds. No wheezing or rales.   Abdominal:      General: Bowel sounds are normal.      Palpations: Abdomen is soft.      Tenderness: There is no abdominal tenderness.   Musculoskeletal:         General: No swelling.      Cervical back: Neck supple. No rigidity.   Lymphadenopathy:      Cervical: No cervical adenopathy.   Skin:     General: Skin is warm and dry. "   Neurological:      Mental Status: She is alert and oriented to person, place, and time.   Psychiatric:         Behavior: Behavior normal.         Lab Results   Component Value Date    WBC 6.68 04/17/2025    HGB 11.9 (L) 04/17/2025    HGB 11.9 (L) 05/02/2024    HGB 10.9 (L) 03/28/2020    HCT 38.2 04/17/2025    MCV 92 04/17/2025    MCV 93 05/02/2024    MCV 89.6 03/28/2020     04/17/2025    CHOL 157 04/17/2025    TRIG 35 04/17/2025    HDL 55 04/17/2025    LDLCALC 95.0 04/17/2025    LDLCALC 83.2 05/02/2024    ALT 20 04/17/2025    AST 23 04/17/2025     04/17/2025    K 3.9 04/17/2025    CALCIUM 8.8 04/17/2025     04/17/2025    CO2 27 04/17/2025    BUN 13 04/17/2025    CREATININE 1.1 04/17/2025    CREATININE 0.9 12/27/2024    CREATININE 1.1 05/02/2024    EGFRNORACEVR >60 04/17/2025    EGFRNORACEVR >60.0 12/27/2024    EGFRNORACEVR >60.0 05/02/2024    TSH 4.290 (H) 04/17/2025    TSH 4.332 (H) 11/08/2024    TSH 4.594 (H) 05/02/2024    GLU 97 04/17/2025    HGBA1C 5.8 (H) 05/02/2024          The 10-year ASCVD risk score (Temi HSU, et al., 2019) is: 3.7%    Values used to calculate the score:      Age: 49 years      Sex: Female      Is Non- : Yes      Diabetic: No      Tobacco smoker: No      Systolic Blood Pressure: 140 mmHg      Is BP treated: Yes      HDL Cholesterol: 55 mg/dL      Total Cholesterol: 157 mg/dL     Assessment:     1. Routine general medical examination at a health care facility    2. Hypertension, unspecified type      Plan:   1. Routine general medical examination at a health care facility  Heart healthy diet, regular exercise, and regular use of sunscreen.   HM reviewed    2. Hypertension, unspecified type  Overview:  Controlled.  Continue current medications.      Orders:  -     hydroCHLOROthiazide (MICROZIDE) 12.5 mg capsule; Take 1 capsule (12.5 mg total) by mouth once daily.  Dispense: 90 capsule; Refill: 3  -     valsartan (DIOVAN) 80 MG tablet; Take 1  tablet (80 mg total) by mouth once daily.  Dispense: 90 tablet; Refill: 3        There are no Patient Instructions on file for this visit.    Future Appointments   Date Time Provider Department Center   5/22/2025  8:00 AM [facility]URSE, [facility] INTERNAL MEDICINE [facility] IM [place]   6/19/2025 10:30 AM Maris Perdue MD [facility] OBGYN [place] [facility]   10/24/2025  8:20 AM Stephon Camara MD [facility] [place]           Follow up in about 6 months (around 10/24/2025), or if symptoms worsen or fail to improve, for  bpcheck 4 wks,convert to appt with me if >139/89.                  [1]   Patient Active Problem List  Diagnosis    Hypertension   [2]   Social History  Socioeconomic History    Marital status: Single   Tobacco Use    Smoking status: Never    Smokeless tobacco: Never   Substance and Sexual Activity    Alcohol use: Not Currently    Drug use: Never    Sexual activity: Yes     Partners: Male     Birth control/protection: None     Social Drivers of Health     Financial Resource Strain: Low Risk  (4/22/2025)    Overall Financial Resource Strain (CARDIA)     Difficulty of Paying Living Expenses: Not very hard   Food Insecurity: No Food Insecurity (4/22/2025)    Hunger Vital Sign     Worried About Running Out of Food in the Last Year: Never true     Ran Out of Food in the Last Year: Never true   Transportation Needs: No Transportation Needs (4/22/2025)    PRAPARE - Transportation     Lack of Transportation (Medical): No     Lack of Transportation (Non-Medical): No   Physical Activity: Inactive (4/22/2025)    Exercise Vital Sign     Days of Exercise per Week: 0 days     Minutes of Exercise per Session: 0 min   Stress: No Stress Concern Present (4/22/2025)    [place] [employer] - Occupational Stress Questionnaire     Feeling of Stress : Only a little   Housing Stability: Low Risk  (4/22/2025)    Housing Stability Vital Sign     Unable to Pay for Housing in the Last Year: No     Number of Times Moved in the Last  Year: 0     Homeless in the Last Year: No

## 2025-05-22 ENCOUNTER — CLINICAL SUPPORT (OUTPATIENT)
Dept: INTERNAL MEDICINE | Facility: CLINIC | Age: 50
End: 2025-05-22
Payer: COMMERCIAL

## 2025-05-22 VITALS — DIASTOLIC BLOOD PRESSURE: 86 MMHG | SYSTOLIC BLOOD PRESSURE: 130 MMHG

## 2025-05-22 DIAGNOSIS — I10 HYPERTENSION, UNSPECIFIED TYPE: Primary | ICD-10-CM

## 2025-05-22 PROCEDURE — 99999 PR PBB SHADOW E&M-EST. PATIENT-LVL III: CPT | Mod: PBBFAC,,,

## 2025-05-22 NOTE — PROGRESS NOTES
Pt initial reading was elevated. Had pt sit for ten minutes. Recheck was in range. Advised pt to continue medications and monitor BP at home and to contact us if her reading become elevated. Pt v/u